# Patient Record
(demographics unavailable — no encounter records)

---

## 2025-03-12 NOTE — REASON FOR VISIT
[TextEntry] : Karma came in for hair laser touch up and teen facial. Active acne and breakouts . Extractions done. Red and Blue light applied.

## 2025-07-10 NOTE — HISTORY OF PRESENT ILLNESS
[FreeTextEntry1] : 19yo  LMP 25 on Norethindrone 0.35mg daily for PCOS here for annual Gyn exam. Has light bleeding for 2-3 days each month but interval is variable fom 2-5 weeks Has had laser hair removal. Remains on Spironolactone. 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 Remains virginal( attempted intercourse but did not have any vaginal penetration, used condoms- no longer in relationship). Is happy on POP(concern regarding use of estrogen/ combination OCPs given heterozygous Factor V Leiden Mutation). Testosterone Free/Total on 24 9.7/46  on 23 12.2/60(Reference range 0.1-6.4/2-45)